# Patient Record
Sex: MALE | Race: WHITE | Employment: FULL TIME | URBAN - METROPOLITAN AREA
[De-identification: names, ages, dates, MRNs, and addresses within clinical notes are randomized per-mention and may not be internally consistent; named-entity substitution may affect disease eponyms.]

---

## 2018-10-28 ENCOUNTER — APPOINTMENT (OUTPATIENT)
Dept: GENERAL RADIOLOGY | Age: 23
End: 2018-10-28
Payer: COMMERCIAL

## 2018-10-28 ENCOUNTER — HOSPITAL ENCOUNTER (EMERGENCY)
Age: 23
Discharge: HOME | End: 2018-10-28
Payer: COMMERCIAL

## 2018-10-28 VITALS
OXYGEN SATURATION: 95 % | HEART RATE: 49 BPM | WEIGHT: 220 LBS | BODY MASS INDEX: 29.16 KG/M2 | RESPIRATION RATE: 10 BRPM | DIASTOLIC BLOOD PRESSURE: 58 MMHG | HEIGHT: 73 IN | TEMPERATURE: 98.8 F | SYSTOLIC BLOOD PRESSURE: 121 MMHG

## 2018-10-28 DIAGNOSIS — M25.511 RIGHT SHOULDER PAIN, UNSPECIFIED CHRONICITY: Primary | ICD-10-CM

## 2018-10-28 PROCEDURE — 6370000000 HC RX 637 (ALT 250 FOR IP): Performed by: PHYSICIAN ASSISTANT

## 2018-10-28 PROCEDURE — 99283 EMERGENCY DEPT VISIT LOW MDM: CPT

## 2018-10-28 PROCEDURE — 73030 X-RAY EXAM OF SHOULDER: CPT

## 2018-10-28 RX ORDER — IBUPROFEN 600 MG/1
600 TABLET ORAL ONCE
Status: COMPLETED | OUTPATIENT
Start: 2018-10-28 | End: 2018-10-28

## 2018-10-28 RX ADMIN — IBUPROFEN 600 MG: 600 TABLET ORAL at 10:19

## 2018-10-28 ASSESSMENT — PAIN SCALES - GENERAL
PAINLEVEL_OUTOF10: 9
PAINLEVEL_OUTOF10: 9

## 2018-10-28 ASSESSMENT — ENCOUNTER SYMPTOMS
VOMITING: 0
NAUSEA: 0

## 2018-10-28 NOTE — ED PROVIDER NOTES
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    HPI:  (Location, Duration, Timing, Severity,Quality, Assoc Sx, Context, Modifying factors)  This is a  21 y.o. male who presents to the emergency department today for evaluation for right shoulder pain. Patient has been experiencing right shoulder pain for the past 2-3 days. Patient denies falling or injuring his shoulder and any way but he states that for work installs big glass block on does have to lift heavy bags of gravity is unsure if this could be causing his symptoms. He states that he has had worsening pain since last night, and although he denies any history of dislocation of the shoulder he states \"my friend is in nursing school and she told me that I could've popped it out and popped it back in\". Patient is sitting comfortably in bed and is currently rating his pain as a 9/10. No known alleviating factors except any medications today to improve his pain. He denies any numbness, tingling or weakness. No radiation of the pain. No previous injury or previous shoulder. He has no other complaints at this time    PastMedical/Surgical History:      Diagnosis Date    ADHD (attention deficit hyperactivity disorder)     Bipolar affective ([facility] [address]      History reviewed. No pertinent surgical history.     Medications:  Discharge Medication List as of 10/28/2018 10:20 AM      CONTINUE these popped it out and popped it back in\". Patient is sitting comfortably in bed and is currently rating his pain as a 9/10. No known alleviating factors except any medications today to improve his pain. He denies any numbness, tingling or weakness. No radiation of the pain. No previous injury or previous shoulder. He has no other complaints at this time    Physical exam there is diffuse tenderness over the right shoulder, neurovascularly intact. X-rays negative. I believe that he likely has a sprain/strain which is causing his symptoms however he is concerned that he may have dislocated his shoulder, therefore patient will be placed in a sling and will be referred to orthopedics, supportive care discussed at home otherwise. He is to follow-up with orthopedics within the next week. He is return to the ED for any new or worsening symptoms. Patient was understanding and agreeable with plan. Stable for discharge. Suspicion is low at this time for dislocation, subluxation, arterial occlusion, occult fracture, septic arthritis, gout, or other emergent etiology    The patient tolerated their visit well. I evaluated the patient. The physician was available for consultation as needed. The patient and / or the family were informed of the results of anytests, a time was given to answer questions, a plan was proposed and they agreed with plan. CLINICAL IMPRESSION:  1.  Right shoulder pain, unspecified chronicity        DISPOSITION Decision To Discharge 10/28/2018 10:16:54 AM      PATIENT REFERRED TO:  Jim Garces MD  [address]  [facility]  [phone]    Schedule an appointment as soon as possible for a visit in 1 week      [facility] Emergency Department  [address] [place] [address]  [phone]    As needed, If symptoms worsen      DISCHARGE MEDICATIONS:  Discharge Medication List as of 10/28/2018 10:20 AM          DISCONTINUED MEDICATIONS:  Discharge